# Patient Record
Sex: FEMALE | Race: WHITE | NOT HISPANIC OR LATINO
[De-identification: names, ages, dates, MRNs, and addresses within clinical notes are randomized per-mention and may not be internally consistent; named-entity substitution may affect disease eponyms.]

---

## 2019-07-25 PROBLEM — Z00.00 ENCOUNTER FOR PREVENTIVE HEALTH EXAMINATION: Status: ACTIVE | Noted: 2019-07-25

## 2019-07-26 ENCOUNTER — APPOINTMENT (OUTPATIENT)
Dept: CARDIOLOGY | Facility: CLINIC | Age: 59
End: 2019-07-26
Payer: COMMERCIAL

## 2019-07-26 ENCOUNTER — NON-APPOINTMENT (OUTPATIENT)
Age: 59
End: 2019-07-26

## 2019-07-26 VITALS — SYSTOLIC BLOOD PRESSURE: 160 MMHG | DIASTOLIC BLOOD PRESSURE: 82 MMHG

## 2019-07-26 VITALS
HEIGHT: 64 IN | HEART RATE: 54 BPM | WEIGHT: 146 LBS | SYSTOLIC BLOOD PRESSURE: 180 MMHG | DIASTOLIC BLOOD PRESSURE: 82 MMHG | BODY MASS INDEX: 24.92 KG/M2

## 2019-07-26 DIAGNOSIS — Z82.49 FAMILY HISTORY OF ISCHEMIC HEART DISEASE AND OTHER DISEASES OF THE CIRCULATORY SYSTEM: ICD-10-CM

## 2019-07-26 DIAGNOSIS — Z87.891 PERSONAL HISTORY OF NICOTINE DEPENDENCE: ICD-10-CM

## 2019-07-26 PROCEDURE — 99204 OFFICE O/P NEW MOD 45 MIN: CPT

## 2019-07-26 PROCEDURE — 93000 ELECTROCARDIOGRAM COMPLETE: CPT

## 2019-07-26 RX ORDER — CHROMIUM 200 MCG
TABLET ORAL
Refills: 0 | Status: ACTIVE | COMMUNITY

## 2019-07-26 RX ORDER — MULTIVITAMIN
TABLET ORAL
Refills: 0 | Status: ACTIVE | COMMUNITY

## 2019-07-26 RX ORDER — OMEPRAZOLE 20 MG/1
TABLET, DELAYED RELEASE ORAL
Refills: 0 | Status: ACTIVE | COMMUNITY

## 2019-07-28 ENCOUNTER — APPOINTMENT (OUTPATIENT)
Dept: RADIOLOGY | Facility: HOSPITAL | Age: 59
End: 2019-07-28
Payer: COMMERCIAL

## 2019-07-28 ENCOUNTER — HOSPITAL ENCOUNTER (EMERGENCY)
Facility: HOSPITAL | Age: 59
Discharge: HOME | End: 2019-07-28
Attending: EMERGENCY MEDICINE
Payer: COMMERCIAL

## 2019-07-28 VITALS
HEART RATE: 56 BPM | OXYGEN SATURATION: 97 % | RESPIRATION RATE: 18 BRPM | SYSTOLIC BLOOD PRESSURE: 165 MMHG | DIASTOLIC BLOOD PRESSURE: 51 MMHG | TEMPERATURE: 98.2 F

## 2019-07-28 DIAGNOSIS — S42.211A FX HUMERAL NECK, RIGHT, CLOSED, INITIAL ENCOUNTER: Primary | ICD-10-CM

## 2019-07-28 PROCEDURE — 96374 THER/PROPH/DIAG INJ IV PUSH: CPT

## 2019-07-28 PROCEDURE — 99284 EMERGENCY DEPT VISIT MOD MDM: CPT | Performed by: EMERGENCY MEDICINE

## 2019-07-28 PROCEDURE — 6370000100 HC RX 637 (ALT 250 FOR IP): Performed by: EMERGENCY MEDICINE

## 2019-07-28 PROCEDURE — 73060 X-RAY EXAM OF HUMERUS: CPT | Mod: RT

## 2019-07-28 PROCEDURE — 99284 EMERGENCY DEPT VISIT MOD MDM: CPT

## 2019-07-28 PROCEDURE — 6360000200 HC RX 636 W HCPCS (ALT 250 FOR IP): Performed by: EMERGENCY MEDICINE

## 2019-07-28 RX ORDER — OXYCODONE AND ACETAMINOPHEN 5; 325 MG/1; MG/1
1 TABLET ORAL EVERY 4 HOURS PRN
Qty: 20 TABLET | Refills: 0 | Status: SHIPPED | OUTPATIENT
Start: 2019-07-28 | End: 2019-07-28 | Stop reason: SDUPTHER

## 2019-07-28 RX ORDER — FENTANYL CITRATE/PF 50 MCG/ML
50 PLASTIC BAG, INJECTION (ML) INTRAVENOUS ONCE
Status: COMPLETED | OUTPATIENT
Start: 2019-07-28 | End: 2019-07-28

## 2019-07-28 RX ORDER — OXYCODONE AND ACETAMINOPHEN 5; 325 MG/1; MG/1
1 TABLET ORAL EVERY 4 HOURS PRN
Qty: 25 TABLET | Refills: 0 | Status: SHIPPED | OUTPATIENT
Start: 2019-07-28

## 2019-07-28 RX ORDER — OXYCODONE AND ACETAMINOPHEN 5; 325 MG/1; MG/1
2 TABLET ORAL ONCE
Status: COMPLETED | OUTPATIENT
Start: 2019-07-28 | End: 2019-07-28

## 2019-07-28 RX ADMIN — FENTANYL CITRATE 50 MCG: 0.05 INJECTION, SOLUTION INTRAMUSCULAR; INTRAVENOUS at 08:47

## 2019-07-28 RX ADMIN — OXYCODONE HYDROCHLORIDE AND ACETAMINOPHEN 2 TABLET: 5; 325 TABLET ORAL at 10:12

## 2019-07-28 NOTE — ED PROVIDER NOTES
HPI:  Chief Complaint   Patient presents with   • Arm Pain     right shoulder pain from a fall radiating to entire arm       Patient arrives via EMS amides from her motel after sustaining injury to her right upper arm.  Patient states she slipped in the bathtub reached out to grab the shower curtain and then fell onto her outstretched right arm.  Immediately she began experiencing pain with inability to move her right upper extremity due to pain.  This was associated also with some paresthesias to her hand.  She denies any other injury including denies any head injury, neck pain, elbow or wrist pain.  No current neurologic complaints at this time.          HISTORY:  Past Medical History:   Diagnosis Date   • Hypertension        History reviewed. No pertinent surgical history.    History reviewed. No pertinent family history.    Social History     Tobacco Use   • Smoking status: Former Smoker   • Smokeless tobacco: Former User   Substance Use Topics   • Alcohol use: Defer   • Drug use: Not on file         ROS:  Review of Systems  Pertinent system review documented HPI  PE:  ED Triage Vitals [07/28/19 0757]   Temp Heart Rate Resp BP SpO2   36.8 °C (98.2 °F) 54 18 157/62 97 %      Temp Source Heart Rate Source Patient Position BP Location FiO2 (%)   Oral -- -- -- --       Physical Exam  Patient has tenderness without deformity or ecchymosis to the proximal right humerus.  No clavicular or AC tenderness.  No elbow or wrist tenderness.  Right upper extremity ulnar, median, radial nerve motor or sensory examination of the hand is intact.  Deltoid sensation is intact.  No respiratory distress.  ED LABS:  Labs Reviewed - No data to display      ED IMAGES:  X-ray humerus right   Final Result   Impression:      1. Complex fractures of the right humeral neck.          ED PROCEDURES:  Procedures    ED COURSE:   X-rays to assess for pretest presumptive diagnosis of humeral fracture which is confirmed.  CD disc of x-rays were  "to take home provided.  Pain medication given.         MDM:  MDM patient presents having sustained a fracture of her right humeral neck which is characterized as being \"complex\".  She will placed in an immobilizer and given pain medication and advised to follow-up with orthopedic surgeon upon her arrival home.  She intends to being home in the next week and advised that tomorrow would be a good day to contact what ever follow-up clinic she needs to that this can be arranged before hand so able to see upon returning home.    Final diagnoses:   [S42.211A] Fx humeral neck, right, closed, initial encounter        Ramu Thompson MD  07/28/19 1323    "

## 2019-08-03 ENCOUNTER — EMERGENCY (EMERGENCY)
Facility: HOSPITAL | Age: 59
LOS: 1 days | Discharge: HOME | End: 2019-08-03
Attending: EMERGENCY MEDICINE
Payer: COMMERCIAL

## 2019-08-03 VITALS
DIASTOLIC BLOOD PRESSURE: 68 MMHG | TEMPERATURE: 98 F | RESPIRATION RATE: 20 BRPM | OXYGEN SATURATION: 98 % | SYSTOLIC BLOOD PRESSURE: 153 MMHG | HEART RATE: 66 BPM

## 2019-08-03 VITALS
WEIGHT: 145.95 LBS | DIASTOLIC BLOOD PRESSURE: 68 MMHG | TEMPERATURE: 98 F | HEART RATE: 66 BPM | RESPIRATION RATE: 18 BRPM | OXYGEN SATURATION: 97 % | HEIGHT: 64 IN | SYSTOLIC BLOOD PRESSURE: 153 MMHG

## 2019-08-03 PROCEDURE — 73090 X-RAY EXAM OF FOREARM: CPT | Mod: 26,LT

## 2019-08-03 PROCEDURE — 73200 CT UPPER EXTREMITY W/O DYE: CPT

## 2019-08-03 PROCEDURE — 23600 CLTX PROX HUMRL FX W/O MNPJ: CPT | Mod: RT

## 2019-08-03 PROCEDURE — 73080 X-RAY EXAM OF ELBOW: CPT | Mod: 26,RT

## 2019-08-03 PROCEDURE — 73200 CT UPPER EXTREMITY W/O DYE: CPT | Mod: 26,RT

## 2019-08-03 PROCEDURE — 71250 CT THORAX DX C-: CPT | Mod: 26

## 2019-08-03 PROCEDURE — 73030 X-RAY EXAM OF SHOULDER: CPT | Mod: 26,RT

## 2019-08-03 PROCEDURE — 73030 X-RAY EXAM OF SHOULDER: CPT

## 2019-08-03 PROCEDURE — 99284 EMERGENCY DEPT VISIT MOD MDM: CPT | Mod: 25

## 2019-08-03 PROCEDURE — 73090 X-RAY EXAM OF FOREARM: CPT

## 2019-08-03 PROCEDURE — 71250 CT THORAX DX C-: CPT

## 2019-08-03 PROCEDURE — 99282 EMERGENCY DEPT VISIT SF MDM: CPT | Mod: 57

## 2019-08-03 PROCEDURE — 99284 EMERGENCY DEPT VISIT MOD MDM: CPT

## 2019-08-03 PROCEDURE — 73080 X-RAY EXAM OF ELBOW: CPT

## 2019-08-03 RX ORDER — ACETAMINOPHEN 500 MG
325 TABLET ORAL ONCE
Refills: 0 | Status: COMPLETED | OUTPATIENT
Start: 2019-08-03 | End: 2019-08-03

## 2019-08-03 RX ORDER — OXYCODONE AND ACETAMINOPHEN 5; 325 MG/1; MG/1
1 TABLET ORAL ONCE
Refills: 0 | Status: DISCONTINUED | OUTPATIENT
Start: 2019-08-03 | End: 2019-08-03

## 2019-08-03 RX ADMIN — OXYCODONE AND ACETAMINOPHEN 1 TABLET(S): 5; 325 TABLET ORAL at 18:59

## 2019-08-03 RX ADMIN — Medication 325 MILLIGRAM(S): at 19:00

## 2019-08-03 NOTE — ED ADULT TRIAGE NOTE - CHIEF COMPLAINT QUOTE
pt with pain, numbness, and swelling to right shoulder and arm s/p a fall 7 days ago while on vacation. states she was hx humerus fx

## 2019-08-03 NOTE — ED STATDOCS - ATTENDING CONTRIBUTION TO CARE
I, Melita Schneider, performed the initial face to face bedside interview with this patient regarding history of present illness, review of symptoms and relevant past medical, social and family history.  I completed an independent physical examination.  I was the initial provider who evaluated this patient. I have signed out the follow up of any pending tests (i.e. labs, radiological studies) to the ACP.  I have communicated the patient’s plan of care and disposition with the ACP.  The history, relevant review of systems, past medical and surgical history, medical decision making, and physical examination was documented by the scribe in my presence and I attest to the accuracy of the documentation.

## 2019-08-03 NOTE — CONSULT NOTE ADULT - SUBJECTIVE AND OBJECTIVE BOX
Pt Name: DAVIDA REID    MRN: [id number]      Patient is a 58y Female presenting to the emergency department with a chief complaint of fall 1 week ago.  Patient is a 58y old  Female who presents with a chief complaint of fall 1 week ago.  Patient was on vacation and slipped coming out of shower, landing on right side. patient was seen in hospital and found to have proximal humerus fx. patient c/o right anterior rib pain as well.  patient having CT of chest by ER, CT of shoulder added    HEALTH ISSUES - PROBLEM Dx:right proximal humerus fx       .      REVIEW OF SYSTEMS      General:	    Skin/Breast:  	  Ophthalmologic:  	  ENMT:	    Respiratory and Thorax:  	  Cardiovascular:	    Gastrointestinal:	    Genitourinary:	    Musculoskeletal:	 right shoulder pain     Neurological:	    Psychiatric:	    Hematology/Lymphatics:	    Endocrine:	    Allergic/Immunologic:	    ROS is otherwise negative.    PAST MEDICAL & SURGICAL HISTORY:  PAST MEDICAL & SURGICAL HISTORY:      Allergies: No Known Allergies      Medications:     FAMILY HISTORY:  : non-contributory    Social History: former ETOH abuse    Ambulation: Walking independently               PHYSICAL EXAM:    Vital Signs Last 24 Hrs  T(C): 36.7 (03 Aug 2019 17:18), Max: 36.7 (03 Aug 2019 17:16)  T(F): 98 (03 Aug 2019 17:18), Max: 98 (03 Aug 2019 17:16)  HR: 66 (03 Aug 2019 17:18) (66 - 66)  BP: 153/68 (03 Aug 2019 17:18) (153/68 - 153/68)  BP(mean): --  RR: 20 (03 Aug 2019 17:18) (18 - 20)  SpO2: 98% (03 Aug 2019 17:18) (97% - 98%)  Daily Height in cm: 162.56 (03 Aug 2019 17:16)    Daily     Appearance: Alert, responsive, in no acute distress.    Neurological: Sensation is grossly intact to light touch. 5/5 motor function of all extremities. No focal deficits or weaknesses found.    Skin: no rash on visible skin. Skin is clean, dry and intact. No bleeding. No abrasions. No ulcerations.    Vascular: 2+ distal pulses. Cap refill < 2 sec. No signs of venous insuffiency or stasis. No extremity ulcerations. No cyanosis.    Musculoskeletal:         Right Upper Extremity:positive swelling to upper arm with ecchymosis.  sensation intact to upper arm and fingers.  FROM to wrist and hand.  pulse intact. arm in sling     Imaging Studies: right shoulder xray reveals positive displaced proximal humerus fx, appear to be in GH joint     Case discussed with Dr. Coreas    A/P:  Pt is a 58y Female with presents with a chief complaint of fall 1 week ago found to have proximal humerus fx     PLAN:   *NWB RUE  *Continue sling   * pain control   *outpatient follow up Dr. Coreas

## 2019-08-03 NOTE — ED STATDOCS - PROGRESS NOTE DETAILS
PA Note: PT evaluated by intake physician. HPI/PE/ROS as noted above. Will follow up plan per intake physician. PA Note: Pt seen by ortho PA, pt to follow up with Dr. Coreas.

## 2019-08-03 NOTE — ED STATDOCS - MUSCULOSKELETAL, MLM
Tenderness to palpation of proximal right forearm, laterally and medially, and tenderness to proximal humerus. Clavicle pain.

## 2019-08-03 NOTE — ED STATDOCS - CLINICAL SUMMARY MEDICAL DECISION MAKING FREE TEXT BOX
one week - 10 days s/p fall. broke humerus. has pain in sling and went to opital after it occurred. was in [employer] park and walking around with family. pe as documented. xray + humerus fracture, o/w neg. ct shold same and ct chest wall neg. to fu with ortho. consult done in er

## 2019-08-03 NOTE — ED STATDOCS - OBJECTIVE STATEMENT
59 y/o F pt with PMHx presents to the ED c/o right shoulder pain s/p a mechanical fall in the shower 6 days ago. Pt was diagnosed with a broken humerus after slipping in the shower while on vacation with her family in [place]. Pt landed on the palm of her hand, and just finished the prescribed Oxycodone given by a hospital in [facility], where she was diagnosed. Swelling and bruising has worsened throughout the week, and states that she has right finger numbness, neck pain, bruising along her chest and upper back. Pt is a recovering alcohol abuser. Denies LOC, dizziness, fever, chills, nausea, vomiting. Has been consulting Dr. Freitas. Last medication was 2 Tylenol at 14:00 today.

## 2019-08-03 NOTE — CONSULT NOTE ADULT - ATTENDING COMMENTS
Ortho Trauma Attending:    Agree with above PA note. Fracture will likely be non-operative. Sling for comfort, follow up in the office for repeat x-rays.    Juan Coreas MD  Ortho Trauma Surgery

## 2019-08-14 PROBLEM — Z78.9 OTHER SPECIFIED HEALTH STATUS: Chronic | Status: ACTIVE | Noted: 2019-08-03

## 2019-08-29 PROBLEM — S42.201A FRACTURE OF HUMERUS, PROXIMAL, RIGHT, CLOSED: Status: ACTIVE | Noted: 2019-08-29

## 2019-09-03 ENCOUNTER — APPOINTMENT (OUTPATIENT)
Age: 59
End: 2019-09-03
Payer: COMMERCIAL

## 2019-09-03 VITALS
DIASTOLIC BLOOD PRESSURE: 86 MMHG | SYSTOLIC BLOOD PRESSURE: 153 MMHG | WEIGHT: 145 LBS | HEIGHT: 64 IN | BODY MASS INDEX: 24.75 KG/M2 | TEMPERATURE: 98.1 F | HEART RATE: 70 BPM

## 2019-09-03 DIAGNOSIS — S42.201A UNSPECIFIED FRACTURE OF UPPER END OF RIGHT HUMERUS, INITIAL ENCOUNTER FOR CLOSED FRACTURE: ICD-10-CM

## 2019-09-03 PROCEDURE — 73030 X-RAY EXAM OF SHOULDER: CPT | Mod: RT

## 2019-09-03 PROCEDURE — 99024 POSTOP FOLLOW-UP VISIT: CPT

## 2019-09-03 NOTE — HISTORY OF PRESENT ILLNESS
[de-identified] : the patient is a pleasant 59-year-old female today for evaluation of right shoulder pain. About 5 weeks ago she suffered a fall at [employer]. With a proximal humerus fracture was diagnosed. She subsequently returned home. This is her first office visit with me today.The patient states the pain is made worse with activity and relieved with rest.  Aching, 4/10 will, improving. Wound

## 2019-09-03 NOTE — PHYSICAL EXAM
[de-identified] : Physical Exam:\par General: Well appearing, no acute distress, A&O\par Neurologic: A&Ox3, No focal deficits\par Head: NCAT without abrasions, lacerations, or ecchymosis to head, face, or scalp\par Respiratory: Equal chest wall expansion bilaterally, no accessory muscle use\par Lymphatic: No lymphadenopathy palpated\par Skin: Warm and dry\par Psychiatric: Normal mood and affect\par \par SILT r/m/u\par Fires AIN/PIN/ulnar\par 2+ radial pulse\par brisk capillary refill

## 2019-09-03 NOTE — DISCUSSION/SUMMARY
[de-identified] : 59-year-old female status post right proximal humerus fracture. We discussed that these tend to heal well without operative intervention. We will continue down the standard nonoperative pathway. physical therapy ordered today. She'll return in 5-6 weeks with repeat x-rays and continue the nonoperative pathway. The patient was given the opportunity to ask questions and all questions were answered to their satisfaction.\par \par Juan Coreas MD\par Orthopaedic Trauma Surgeon\par [facility]\par [facility] [place]\par \par \par \par

## 2019-09-03 NOTE — REASON FOR VISIT
[Initial Visit] : an initial visit for [Humerus Fracture] : humerus fracture [Friend] : friend [FreeTextEntry2] : Right humerus fracture

## 2019-10-11 ENCOUNTER — APPOINTMENT (OUTPATIENT)
Dept: ORTHOPEDIC SURGERY | Facility: CLINIC | Age: 59
End: 2019-10-11
Payer: COMMERCIAL

## 2019-10-11 VITALS — TEMPERATURE: 98.1 F | WEIGHT: 145 LBS | HEIGHT: 64 IN | BODY MASS INDEX: 24.75 KG/M2

## 2019-10-11 PROCEDURE — 99024 POSTOP FOLLOW-UP VISIT: CPT

## 2019-10-11 PROCEDURE — 73030 X-RAY EXAM OF SHOULDER: CPT | Mod: TC,RT

## 2019-10-11 NOTE — HISTORY OF PRESENT ILLNESS
[de-identified] : the patient is a pleasant 59-year-old female today for evaluation of right shoulder pain. About 10weeks ago she suffered a fall at [place]. With a proximal humerus fracture was diagnosed. She subsequently returned home. The patient states the pain is made worse with activity and relieved with rest. Aching, 4/10.  She has a MRI shoulder and humerus wo contrast at [facility] a couple of weeks ago which was ordered by another doctor.  She is currently in physical therapy.

## 2019-10-11 NOTE — DISCUSSION/SUMMARY
[de-identified] : Patient will continue P.T. for AAROM/PROM, NWB, modalities.  Will call her after MRI is reviewed by Dr. Coreas.  She will return in 1  month for eval and xrays right shoulder\par Addendum:  MRI report reviewed by Dr. Coreas.  No change in plan.  Continue P.T.  return to office as scheduled\par \par

## 2019-10-11 NOTE — REASON FOR VISIT
[Follow-Up Visit] : a follow-up visit for [Humerus Fracture] : humerus fracture [FreeTextEntry2] : Right humerus fracture follow up.

## 2019-10-11 NOTE — PHYSICAL EXAM
[de-identified] : right shoulder:  FROM elbow, limited ROM shoulder, n/v intact, motor intact [de-identified] : xray right shoulder 2 views:  fracture right proximal humerus

## 2019-10-13 NOTE — ED STATDOCS - CHPI ED RELIEVING FACTORS
FOCUS/GOAL  Discharge planning    ASSESSMENT, INTERVENTIONS AND CONTINUING PLAN FOR GOAL:  Pt removed Zio Patch due to increased skin irritation under adhesive. Patch was placed per pt 7 days ago when she was told to remove it if it bothers her skin. Pt states she has blistered in the past from other adhesives. (Patch will be mailed from pt's home upon discharge tomorrow) Denies pain. Continent of Bowel and bladder.      nothing

## 2019-10-17 ENCOUNTER — MOBILE ON CALL (OUTPATIENT)
Age: 59
End: 2019-10-17

## 2019-11-04 ENCOUNTER — APPOINTMENT (OUTPATIENT)
Dept: ORTHOPEDIC SURGERY | Facility: CLINIC | Age: 59
End: 2019-11-04
Payer: COMMERCIAL

## 2019-11-04 VITALS
SYSTOLIC BLOOD PRESSURE: 167 MMHG | WEIGHT: 146 LBS | HEART RATE: 69 BPM | DIASTOLIC BLOOD PRESSURE: 90 MMHG | HEIGHT: 64 IN | BODY MASS INDEX: 24.92 KG/M2

## 2019-11-04 DIAGNOSIS — S42.201D UNSPECIFIED FRACTURE OF UPPER END OF RIGHT HUMERUS, SUBSEQUENT ENCOUNTER FOR FRACTURE WITH ROUTINE HEALING: ICD-10-CM

## 2019-11-04 PROCEDURE — 73030 X-RAY EXAM OF SHOULDER: CPT | Mod: RT

## 2019-11-04 PROCEDURE — 99213 OFFICE O/P EST LOW 20 MIN: CPT

## 2019-11-04 NOTE — PHYSICAL EXAM
[de-identified] : Physical Exam:\par General: Well appearing, no acute distress, A&O\par Neurologic: A&Ox3, No focal deficits\par Head: NCAT without abrasions, lacerations, or ecchymosis to head, face, or scalp\par Respiratory: Equal chest wall expansion bilaterally, no accessory muscle use\par Lymphatic: No lymphadenopathy palpated\par Skin: Warm and dry\par Psychiatric: Normal mood and affect\par \par right upper extremity\par SILT r/m/u\par Fires AIN/PIN/ulnar\par 2+ radial pulse\par brisk capillary refill\par forward flexion 65, abduction 65 [de-identified] : plain films of the right shoulder were obtained today. They show a healing proximal humerus fracture.

## 2019-11-04 NOTE — DISCUSSION/SUMMARY
[de-identified] : 59-year-old female status post right proximal humerus fracture. We discussed that these tend to heal well without operative intervention. We will continue down the standard nonoperative pathway. physical therapy ordered today. She'll return in 6-8 weeks unless she is feeling she is Healing well & obtain repeat x-rays and continue the nonoperative pathway. The patient was given the opportunity to ask questions and all questions were answered to their satisfaction.\par \par Juan Coreas MD\par Orthopaedic Trauma Surgeon\par [facility]\par [facility] [place]\par \par \par \par

## 2019-11-04 NOTE — HISTORY OF PRESENT ILLNESS
[de-identified] : the patient is a pleasant 59-year-old female today for follow up evaluation of right shoulder pain. About 10 weeks ago she suffered a fall at [employer]. With a proximal humerus fracture was diagnosed. She subsequently returned home. The patient states the pain is made worse with activity and relieved with rest.  Aching, 2/10 , improving. [Bending] : worsened by bending [Lifting] : worsened by lifting [Recumbency] : relieved by recumbency [Rest] : relieved by rest

## 2019-11-04 NOTE — REASON FOR VISIT
[Follow-Up Visit] : a follow-up visit for [Humerus Fracture] : humerus fracture [FreeTextEntry2] : right

## 2019-11-26 ENCOUNTER — MOBILE ON CALL (OUTPATIENT)
Age: 59
End: 2019-11-26

## 2020-01-20 ENCOUNTER — APPOINTMENT (OUTPATIENT)
Dept: CARDIOLOGY | Facility: CLINIC | Age: 60
End: 2020-01-20

## 2020-11-03 ENCOUNTER — APPOINTMENT (OUTPATIENT)
Dept: CARDIOLOGY | Facility: CLINIC | Age: 60
End: 2020-11-03
Payer: COMMERCIAL

## 2020-11-03 ENCOUNTER — NON-APPOINTMENT (OUTPATIENT)
Age: 60
End: 2020-11-03

## 2020-11-03 VITALS
HEART RATE: 71 BPM | HEIGHT: 64 IN | RESPIRATION RATE: 16 BRPM | WEIGHT: 157 LBS | DIASTOLIC BLOOD PRESSURE: 69 MMHG | SYSTOLIC BLOOD PRESSURE: 147 MMHG | BODY MASS INDEX: 26.8 KG/M2 | TEMPERATURE: 97.8 F

## 2020-11-03 VITALS — DIASTOLIC BLOOD PRESSURE: 80 MMHG | SYSTOLIC BLOOD PRESSURE: 132 MMHG

## 2020-11-03 DIAGNOSIS — R09.89 OTHER SPECIFIED SYMPTOMS AND SIGNS INVOLVING THE CIRCULATORY AND RESPIRATORY SYSTEMS: ICD-10-CM

## 2020-11-03 PROCEDURE — 99214 OFFICE O/P EST MOD 30 MIN: CPT

## 2020-11-03 PROCEDURE — 99072 ADDL SUPL MATRL&STAF TM PHE: CPT

## 2020-11-03 PROCEDURE — 93000 ELECTROCARDIOGRAM COMPLETE: CPT

## 2020-11-03 RX ORDER — ASPIRIN 81 MG
81 TABLET, DELAYED RELEASE (ENTERIC COATED) ORAL
Refills: 0 | Status: DISCONTINUED | COMMUNITY
End: 2020-11-03

## 2020-11-03 NOTE — PHYSICAL EXAM
[Normal Conjunctiva] : the conjunctiva exhibited no abnormalities [Eyelids - No Xanthelasma] : the eyelids demonstrated no xanthelasmas [Normal Oral Mucosa] : normal oral mucosa [No Oral Pallor] : no oral pallor [No Oral Cyanosis] : no oral cyanosis [Respiration, Rhythm And Depth] : normal respiratory rhythm and effort [Exaggerated Use Of Accessory Muscles For Inspiration] : no accessory muscle use [Auscultation Breath Sounds / Voice Sounds] : lungs were clear to auscultation bilaterally [FreeTextEntry1] : Regular rhythm, normal S1-S2, grade 1/6 midsystolic murmur [Abdomen Soft] : soft [Abdomen Tenderness] : non-tender [Abdomen Mass (___ Cm)] : no abdominal mass palpated [Abnormal Walk] : normal gait [Gait - Sufficient For Exercise Testing] : the gait was sufficient for exercise testing [Nail Clubbing] : no clubbing of the fingernails [Cyanosis, Localized] : no localized cyanosis [Petechial Hemorrhages (___cm)] : no petechial hemorrhages [Skin Color & Pigmentation] : normal skin color and pigmentation [] : no rash [No Venous Stasis] : no venous stasis [Skin Lesions] : no skin lesions [No Skin Ulcers] : no skin ulcer [No Xanthoma] : no  xanthoma was observed [Oriented To Time, Place, And Person] : oriented to person, place, and time [Affect] : the affect was normal [Mood] : the mood was normal [No Anxiety] : not feeling anxious

## 2020-11-03 NOTE — ASSESSMENT
[FreeTextEntry1] : EKG demonstrates normal sinus rhythm at a rate of 71. There is poor R-wave progression V1 to V3 and nonspecific T-wave flattening-essentially unchanged;\par \par In summary this 60-year-old woman with history of hypertension and hyperlipidemia with abnormal EKG questionable or neck bruit has had otherwise stable cardiac pattern;\par \par Plan:\par \par Discussed recommendation for trying to keep medication schedule more regimented;\par \par Ongoing efforts at low sodium diet and walking exercise plan;\par \par Agree with continuing current medical regimen\par \par Recommend carotid duplex study and transthoracic echocardiogram in the near future or prior to next visit;\par \par Continue timely checkups and laboratory blood tests with primary care encouraged;

## 2020-11-03 NOTE — REASON FOR VISIT
[Follow-Up - Clinic] : a clinic follow-up of [FreeTextEntry1] : The patient is a 60-year-old white female  from Dr. Lopez's office, who returns today to our office for general cardiac checkup;\par \par She does have a history of mild hypertension and prior history for syncope over a year ago which was felt to be likely vasovagal syncope;\par \par Patient reports overall she's been generally feeling well and has had no significant chest pain, shortness of breath, outpatient or dizziness;\par \par [place], she did have a falling episode and injury with humeral fracture, but it did not involve syncope;

## 2020-11-03 NOTE — HISTORY OF PRESENT ILLNESS
[FreeTextEntry1] : She has been tolerating her cholesterol medication and blood pressure medication without difficulty although sometimes she states she varies the time being she takes this she "might sometimes forget";

## 2021-02-08 ENCOUNTER — APPOINTMENT (OUTPATIENT)
Dept: CARDIOLOGY | Facility: CLINIC | Age: 61
End: 2021-02-08
Payer: COMMERCIAL

## 2021-02-08 PROCEDURE — 99072 ADDL SUPL MATRL&STAF TM PHE: CPT

## 2021-02-08 PROCEDURE — 93306 TTE W/DOPPLER COMPLETE: CPT

## 2021-03-16 ENCOUNTER — NON-APPOINTMENT (OUTPATIENT)
Age: 61
End: 2021-03-16

## 2021-03-16 ENCOUNTER — APPOINTMENT (OUTPATIENT)
Dept: CARDIOLOGY | Facility: CLINIC | Age: 61
End: 2021-03-16
Payer: COMMERCIAL

## 2021-03-16 VITALS
SYSTOLIC BLOOD PRESSURE: 138 MMHG | HEIGHT: 64 IN | RESPIRATION RATE: 16 BRPM | HEART RATE: 60 BPM | DIASTOLIC BLOOD PRESSURE: 78 MMHG | TEMPERATURE: 96.4 F | BODY MASS INDEX: 26.8 KG/M2 | WEIGHT: 157 LBS

## 2021-03-16 PROCEDURE — 99072 ADDL SUPL MATRL&STAF TM PHE: CPT

## 2021-03-16 PROCEDURE — 99214 OFFICE O/P EST MOD 30 MIN: CPT

## 2021-03-16 PROCEDURE — 93000 ELECTROCARDIOGRAM COMPLETE: CPT

## 2021-03-16 NOTE — ASSESSMENT
[FreeTextEntry1] : EKG demonstrated a normal sinus rhythm at a rate of 60. There is nonspecific diffuse T-wave changes noted-questionable ischemia;\par \par \par in summary this 60-year-old woman has a history of significant associated cardiac risk factors with abnormal EKG and strong family history for heart disease with a recent chest pain; discomfort syndrome\par \par Plan:\par \par Have discussed possibility of having her undergo a pharmacologic myocardial perfusion stress test  in the near future;\par \par If symptoms persist or worsen between now and stress test could consider doing cardiac catheterization;\par \par ;continue current medical regimen\par \par We'll review her most recent lipid profile and see whether any further antilipemic intervention is warranted;\par \par Return to office within 6-8 weeks or p.r.n.;\par \par Would consider repeating carotid duplex study within 6 months;

## 2021-03-16 NOTE — REASON FOR VISIT
[Follow-Up - Clinic] : a clinic follow-up of [FreeTextEntry1] : A. she is a 60-year-old white female  from Dr. Lopez's office who underwent recent cardiac evaluation with echocardiogram and carotid duplex study;\par \par She has a history for hypertension, abnormal EKG, prior history of syncope and more recent chest pain syndrome.;\par \par There is a very strong family history for early heart disease;\par \par Sadly, she reports her brother at age 65 just recently  from an acute MI;\par Her younger sister recently underwent coronary stenting;\par \par She describes a tightness discomfort in the left parasternal area that comes and goes;\par There has been no significant shortness of breath, palpitations, dizziness or syncope;

## 2021-03-16 NOTE — HISTORY OF PRESENT ILLNESS
[FreeTextEntry1] : She's been tolerating the antihypertensive regimen and overall her blood pressure seems to be better;\par \par She admits to not being able to do much aerobic physical activity due to some arthritides of the lower extremities;\par \par Carotid duplex study performed at [facility] demonstrated at least moderate stenosis in the left internal carotid vessel, and mild diffuse plaquing in the right internal carotid;\par Possible dedicated MRA was recommended;\par \par Transthoracic echocardiogram showed grossly normal cardiac chamber sizes with normal systolic function of the left ventricle and normal ejection fraction in the range of 55-60%;\par No significant valvulopathy was noted;\par \par

## 2021-05-10 ENCOUNTER — APPOINTMENT (OUTPATIENT)
Dept: CARDIOLOGY | Facility: CLINIC | Age: 61
End: 2021-05-10
Payer: COMMERCIAL

## 2021-05-10 PROCEDURE — 78452 HT MUSCLE IMAGE SPECT MULT: CPT

## 2021-05-10 PROCEDURE — 99072 ADDL SUPL MATRL&STAF TM PHE: CPT

## 2021-05-10 PROCEDURE — A9500: CPT

## 2021-05-10 PROCEDURE — 93015 CV STRESS TEST SUPVJ I&R: CPT

## 2021-05-10 RX ADMIN — REGADENOSON 0 MG/5ML: 0.08 INJECTION, SOLUTION INTRAVENOUS at 00:00

## 2021-05-10 RX ADMIN — AMINOPHYLLINE 3 MG/ML: 25 INJECTION, SOLUTION INTRAVENOUS at 00:00

## 2021-05-12 ENCOUNTER — TRANSCRIPTION ENCOUNTER (OUTPATIENT)
Age: 61
End: 2021-05-12

## 2021-05-27 RX ORDER — AMINOPHYLLINE 25 MG/ML
25 INJECTION, SOLUTION INTRAVENOUS
Qty: 0 | Refills: 0 | Status: COMPLETED | OUTPATIENT
Start: 2021-05-10

## 2021-05-27 RX ORDER — REGADENOSON 0.08 MG/ML
0.4 INJECTION, SOLUTION INTRAVENOUS
Qty: 4 | Refills: 0 | Status: COMPLETED | OUTPATIENT
Start: 2021-05-10

## 2021-06-03 RX ORDER — KIT FOR THE PREPARATION OF TECHNETIUM TC99M SESTAMIBI 1 MG/5ML
INJECTION, POWDER, LYOPHILIZED, FOR SOLUTION PARENTERAL
Refills: 0 | Status: COMPLETED | OUTPATIENT
Start: 2021-06-03

## 2021-06-03 RX ADMIN — KIT FOR THE PREPARATION OF TECHNETIUM TC99M SESTAMIBI 0: 1 INJECTION, POWDER, LYOPHILIZED, FOR SOLUTION PARENTERAL at 00:00

## 2021-06-08 ENCOUNTER — NON-APPOINTMENT (OUTPATIENT)
Age: 61
End: 2021-06-08

## 2021-06-08 ENCOUNTER — APPOINTMENT (OUTPATIENT)
Dept: CARDIOLOGY | Facility: CLINIC | Age: 61
End: 2021-06-08
Payer: COMMERCIAL

## 2021-06-08 VITALS
BODY MASS INDEX: 26.8 KG/M2 | HEIGHT: 64 IN | RESPIRATION RATE: 16 BRPM | WEIGHT: 157 LBS | DIASTOLIC BLOOD PRESSURE: 60 MMHG | SYSTOLIC BLOOD PRESSURE: 139 MMHG | TEMPERATURE: 98.5 F | HEART RATE: 60 BPM

## 2021-06-08 PROCEDURE — 93000 ELECTROCARDIOGRAM COMPLETE: CPT

## 2021-06-08 PROCEDURE — 99214 OFFICE O/P EST MOD 30 MIN: CPT

## 2021-06-08 PROCEDURE — 99072 ADDL SUPL MATRL&STAF TM PHE: CPT

## 2021-06-08 NOTE — ASSESSMENT
[FreeTextEntry1] : EKG demonstrated normal sinus rhythm at a rate of 60;  Poor R-wave progression V1 to V3 with nonspecific T-wave changes in the anterior lateral leads-essentially unchanged;\par \par In summary this 60-year-old woman has a history of significant associated cardiac risk factors, abnormal EKG and has had some intermittent twinges of chest discomfort with a borderline abnormal nuclear stress test in the recent past;  Her insurance company [employer], denied the request for cardiac catheterization authorization;\par \par Plan:\par \par Have discussed with patient the possibility of going further in having her undergo a more definitive cardiac catheterization to further delineate her coronary anatomy;\par \par Another option would be to take a more conservative approach since she is minimally to asymptomatic, and it does not appear to be a "high risk scan";\par \par Recommend continue current medical regimen;\par \par Consider repeating nuclear stress test within 6 months to one year down the road for comparison;  Should patient develop any untoward chest symptoms between now and next visit would still consider doing cardiac catheterization at that time if need be;;\par \par ;\par

## 2021-06-08 NOTE — HISTORY OF PRESENT ILLNESS
[FreeTextEntry1] : A nuclear stress test with a pharmacologic protocol performed 5/10/21 and demonstrated-no symptoms of chest pain. EKG remained negative for ST abnormality. Myocardial perfusion imaging suggested a small partially reversible defect in the apical and apical anterior segment compatible with soft tissue attenuation breast artifact, apical thinning and possible ischemia-could not be ruled out;\par \par The patient remains physically active;\par  doing chores and generally feels well with this;\par

## 2021-06-08 NOTE — REASON FOR VISIT
[Symptom and Test Evaluation] : symptom and test evaluation [Hyperlipidemia] : hyperlipidemia [Hypertension] : hypertension [FreeTextEntry3] : A MARIYA [FreeTextEntry1] : A 60-year-old white female  at Dr. Duran's office, who presents back to the office today to discuss results of recent borderline abnormal nuclear stress test;\par \par Patient had been having occasional chest discomfort on and off and has a very strong family history for heart disease including her brother at age 65 who recently  from an acute MI and a younger sister who ordered he has coronary stenting and CAD;\par \par Most recently, patient states that she had been feeling somewhat better and only occasional rare twinges of chest discomfort usually at rest and not particularly exertional related;

## 2021-10-06 ENCOUNTER — APPOINTMENT (OUTPATIENT)
Dept: CARDIOLOGY | Facility: CLINIC | Age: 61
End: 2021-10-06

## 2022-04-12 ENCOUNTER — APPOINTMENT (OUTPATIENT)
Dept: CARDIOLOGY | Facility: CLINIC | Age: 62
End: 2022-04-12
Payer: COMMERCIAL

## 2022-04-12 ENCOUNTER — NON-APPOINTMENT (OUTPATIENT)
Age: 62
End: 2022-04-12

## 2022-04-12 VITALS
HEIGHT: 64 IN | HEART RATE: 59 BPM | BODY MASS INDEX: 26.63 KG/M2 | WEIGHT: 156 LBS | RESPIRATION RATE: 16 BRPM | DIASTOLIC BLOOD PRESSURE: 80 MMHG | SYSTOLIC BLOOD PRESSURE: 130 MMHG

## 2022-04-12 PROCEDURE — 99214 OFFICE O/P EST MOD 30 MIN: CPT

## 2022-04-12 PROCEDURE — 93000 ELECTROCARDIOGRAM COMPLETE: CPT

## 2022-04-12 RX ORDER — ESCITALOPRAM OXALATE 5 MG/1
5 TABLET, FILM COATED ORAL DAILY
Refills: 0 | Status: ACTIVE | COMMUNITY

## 2022-04-12 NOTE — HISTORY OF PRESENT ILLNESS
[FreeTextEntry1] : Her last nuclear stress test was May 2021, and suggested a small apical segment of ischemia and/or artifact; cardiac catheterization was discussed at that time but it was felt that since she was feeling better she wanted to hold off and continue medical management;\par \par More recently, patient has been under a great deal of stress with her daughter who has severe postpartum depression, and has been hospitalized for this.  This seemed to coincide with her increase in chest symptoms recently and why she presents to the office today;\par \par EKG noted to be abnormal today, suggesting anterolateral wall ischemia;

## 2022-04-12 NOTE — REVIEW OF SYSTEMS
[Dyspnea on exertion] : dyspnea during exertion [Chest Discomfort] : chest discomfort [Anxiety] : anxiety [Under Stress] : under stress [Negative] : Heme/Lymph

## 2022-04-12 NOTE — ASSESSMENT
[FreeTextEntry1] : EKG demonstrating normal sinus rhythm at a rate of 59; poor R-wave progression V2 to V4 with diffuse T wave inversions the radha-lateral leads-V3 to V6; suggesting ischemia;\par \par In summary, this 61-year-old woman has a history for associated significant cardiac risk factors including hyperlipidemia and strong family history for early heart disease, Was had a previous abnormal nuclear stress test in our office and now has had recurrent chest pain discomfort syndrome with EKG changes;\par \par Plan:\par \par Patient now recommended for cardiac catheterization;\par \par She is agreeable to schedule this ASAP;\par \par Patient recommended to notify us if symptoms precipitously worsen or change;\par \par Add  enteric-coated aspirin 81 mg p.o. daily;\par \par Follow up to our office postcardiac catheterization or within 2-3 months;

## 2022-04-12 NOTE — REASON FOR VISIT
[CV Risk Factors and Non-Cardiac Disease] : CV risk factors and non-cardiac disease [Arrhythmia/ECG Abnorrmalities] : arrhythmia/ECG abnormalities [Structural Heart and Valve Disease] : structural heart and valve disease [Hyperlipidemia] : hyperlipidemia [FreeTextEntry3] : Jesus Max [FreeTextEntry1] : The patient is a 61-year-old white female who has a history for hyperlipidemia, chest pain syndrome, very strong family history for heart disease for her  siblings and history of abnormal nuclear stress test in the past-who presents back to the office today for cardiac followup;\par \par It seems the patient has been getting some recurring anterior chest discomfort, sometimes at rest and sometimes with exertion;\par \par She gets occasional exertional dyspnea. She denies palpitations, dizziness or syncope;

## 2022-05-25 VITALS
SYSTOLIC BLOOD PRESSURE: 137 MMHG | OXYGEN SATURATION: 99 % | DIASTOLIC BLOOD PRESSURE: 74 MMHG | WEIGHT: 156.09 LBS | HEIGHT: 64 IN | RESPIRATION RATE: 20 BRPM | HEART RATE: 58 BPM

## 2022-05-25 RX ORDER — CHLORHEXIDINE GLUCONATE 213 G/1000ML
1 SOLUTION TOPICAL ONCE
Refills: 0 | Status: DISCONTINUED | OUTPATIENT
Start: 2022-05-26 | End: 2022-06-09

## 2022-05-25 NOTE — H&P PST ADULT - NSICDXPASTMEDICALHX_GEN_ALL_CORE_FT
PAST MEDICAL HISTORY:  Abnormal stress test     HLD (hyperlipidemia)     No pertinent past medical history

## 2022-05-25 NOTE — H&P PST ADULT - HISTORY OF PRESENT ILLNESS
Narrative: This is a 60 yo female with a PMHx of HLD, CP syndrome and a very strong family history for heart disease who had an abnormal NST 5/10/21.  She is c/o recurring anterior chest discomfort at rest and with exertion. Her EKG on 4/12/22 was suggestive of anterolateral wall ischemia.  Today she presents for a [facility] to assess for ischemic HD.     Symptoms:        Angina (Class): CCS 3       Ischemic Symptoms: Chest Pain    Heart Failure: No        Assessment of LVEF:       EF: 55-60%       Assessed by: TTE       Date: 2/15/21    Prior Cardiac Interventions: None    Noninvasive Testing:   Stress Test: Date: 5/10/21       Protocol: Regadenoson Sestamibi Stress Test       Duration of Exercise: NA       Symptoms: Dyspnea, mausea and dizziness during stress which resolved with IV aminophylline       EKG Changes: No significant ST changes       DTS: NA       Myocardial Imaging: Small area of mild to moderate apical/apical anterior wall ischemia.        Risk Assessment: moderate.    Echo: 2/15/21  EF 55-60%  Normal global LV sys function  Normal LV size  LA normal size and structure  Normal RV size and function  RA normal in size and structure  Normal mitral valve   Normal Tricuspid valve  Normal Aortic valve      Antianginal Therapies:        Beta Blockers:         Calcium Channel Blockers: Amlodipine       Long Acting Nitrates:        Ranexa:     Associated Risk Factors:        Cerebrovascular Disease: N/A       Chronic Lung Disease: N/A       Peripheral Arterial Disease: N/A       Chronic Kidney Disease (if yes, what is GFR): N/A       Uncontrolled Diabetes (if yes, what is HgbA1C or FBS): N/A       Poorly Controlled Hypertension (if yes, what is SBP): N/A       Morbid Obesity (if yes, what is BMI): N/A       History of Recent Ventricular Arrhythmia: N/A       Inability to Ambulate Safely: N/A       Need for Therapeutic Anticoagulation: N/A       Antiplatelet or Contrast Allergy: N/A Narrative: This is a 60 yo female with a PMHx of HLD, CP syndrome and a very strong family history for heart disease who had an abnormal NST 5/10/21.  She is c/o recurring anterior chest discomfort at rest and with exertion. Her EKG on 4/12/22 was suggestive of anterolateral wall ischemia.  Today she presents for a [facility] to assess for ischemic HD.     ASA  Mallampati  GFR  Creat  Bleeding Risk  COVID19 -     Symptoms:        Angina (Class): CCS 3       Ischemic Symptoms: Chest Pain    Heart Failure: No        Assessment of LVEF:       EF: 55-60%       Assessed by: TTE       Date: 2/15/21    Prior Cardiac Interventions: None    Noninvasive Testing:   Stress Test: Date: 5/10/21       Protocol: Regadenoson Sestamibi Stress Test       Duration of Exercise: NA       Symptoms: Dyspnea, mausea and dizziness during stress which resolved with IV aminophylline       EKG Changes: No significant ST changes       DTS: NA       Myocardial Imaging: Small area of mild to moderate apical/apical anterior wall ischemia.        Risk Assessment: moderate.    Echo: 2/15/21  EF 55-60%  Normal global LV sys function  Normal LV size  LA normal size and structure  Normal RV size and function  RA normal in size and structure  Normal mitral valve   Normal Tricuspid valve  Normal Aortic valve      Antianginal Therapies:        Beta Blockers:         Calcium Channel Blockers: Amlodipine       Long Acting Nitrates:        Ranexa:     Associated Risk Factors:        Cerebrovascular Disease: N/A       Chronic Lung Disease: N/A       Peripheral Arterial Disease: N/A       Chronic Kidney Disease (if yes, what is GFR): N/A       Uncontrolled Diabetes (if yes, what is HgbA1C or FBS): N/A       Poorly Controlled Hypertension (if yes, what is SBP): N/A       Morbid Obesity (if yes, what is BMI): N/A       History of Recent Ventricular Arrhythmia: N/A       Inability to Ambulate Safely: N/A       Need for Therapeutic Anticoagulation: N/A       Antiplatelet or Contrast Allergy: N/A Narrative: This is a 62 yo female with a PMHx of HLD, CP syndrome and a very strong family history for heart disease who had an abnormal NST 5/10/21.  She is c/o recurring anterior chest discomfort at rest and with exertion. Her EKG on 4/12/22 was suggestive of anterolateral wall ischemia.  Today she presents for a [facility] to assess for ischemic HD.     ASA-2  Mallampati-2  GFR  Creat  Bleeding Risk  COVID19 - Negative 5/25/22    Symptoms:        Angina (Class): CCS 4       Ischemic Symptoms: Chest Pain @ rest intermittently    Heart Failure: No        Assessment of LVEF:       EF: 55-60%       Assessed by: TTE       Date: 2/15/21    Prior Cardiac Interventions: None    Noninvasive Testing:   Stress Test: Date: 5/10/21       Protocol: Regadenoson Sestamibi Stress Test       Duration of Exercise: NA       Symptoms: Dyspnea, mausea and dizziness during stress which resolved with IV aminophylline       EKG Changes: No significant ST changes       DTS: NA       Myocardial Imaging: Small area of mild to moderate apical/apical anterior wall ischemia.        Risk Assessment: moderate.    Echo: 2/15/21  EF 55-60%  Normal global LV sys function  Normal LV size  LA normal size and structure  Normal RV size and function  RA normal in size and structure  Normal mitral valve   Normal Tricuspid valve  Normal Aortic valve      Antianginal Therapies:        Beta Blockers:         Calcium Channel Blockers: Amlodipine       Long Acting Nitrates:        Ranexa:     Associated Risk Factors:        Cerebrovascular Disease: N/A       Chronic Lung Disease: N/A       Peripheral Arterial Disease: N/A       Chronic Kidney Disease (if yes, what is GFR): N/A       Uncontrolled Diabetes (if yes, what is HgbA1C or FBS): N/A       Poorly Controlled Hypertension (if yes, what is SBP): N/A       Morbid Obesity (if yes, what is BMI): N/A       History of Recent Ventricular Arrhythmia: N/A       Inability to Ambulate Safely: N/A       Need for Therapeutic Anticoagulation: N/A       Antiplatelet or Contrast Allergy: N/A Narrative: This is a 62 yo female with a PMHx of HLD, CP syndrome and a very strong family history for heart disease who had an abnormal NST 5/10/21.  She is c/o recurring anterior chest discomfort at rest and with exertion. Her EKG on 4/12/22 was suggestive of anterolateral wall ischemia.  Today she presents for a [facility] to assess for ischemic HD.     ASA-2  Mallampati-2  GFR-99  Creat-0.70  Bleeding Risk-1.6%  COVID19 - Negative 5/25/22    Symptoms:        Angina (Class): CCS 4       Ischemic Symptoms: Chest Pain @ rest intermittently    Heart Failure: No        Assessment of LVEF:       EF: 55-60%       Assessed by: TTE       Date: 2/15/21    Prior Cardiac Interventions: None    Noninvasive Testing:   Stress Test: Date: 5/10/21       Protocol: Regadenoson Sestamibi Stress Test       Duration of Exercise: NA       Symptoms: Dyspnea, mausea and dizziness during stress which resolved with IV aminophylline       EKG Changes: No significant ST changes       DTS: NA       Myocardial Imaging: Small area of mild to moderate apical/apical anterior wall ischemia.        Risk Assessment: moderate.    Echo: 2/15/21  EF 55-60%  Normal global LV sys function  Normal LV size  LA normal size and structure  Normal RV size and function  RA normal in size and structure  Normal mitral valve   Normal Tricuspid valve  Normal Aortic valve      Antianginal Therapies:        Beta Blockers:         Calcium Channel Blockers: Amlodipine       Long Acting Nitrates:        Ranexa:     Associated Risk Factors:        Cerebrovascular Disease: N/A       Chronic Lung Disease: N/A       Peripheral Arterial Disease: N/A       Chronic Kidney Disease (if yes, what is GFR): N/A       Uncontrolled Diabetes (if yes, what is HgbA1C or FBS): N/A       Poorly Controlled Hypertension (if yes, what is SBP): N/A       Morbid Obesity (if yes, what is BMI): N/A       History of Recent Ventricular Arrhythmia: N/A       Inability to Ambulate Safely: N/A       Need for Therapeutic Anticoagulation: N/A       Antiplatelet or Contrast Allergy: N/A

## 2022-05-25 NOTE — H&P PST ADULT - ASSESSMENT
60 yo female with c/o CP and an abnormal stress test for LHC.     Plan: PRE-PROCEDURE ASSESSMENT    -Patient seen and examined  PRE-PROCEDURE ASSESSMENT  -NPO after midnight confirmed  -IV insert  -Patient seen and examined  -Labs reviewed  -Pre-procedure teaching completed with patient   -consent obtained   -Questions answered   60 yo female with c/o CP and an abnormal stress test for LHC.     Plan: PRE-PROCEDURE ASSESSMENT    -Patient seen and examined  PRE-PROCEDURE ASSESSMENT  -NPO after midnight confirmed  -Baby ASA given this am  -IV insert  -IV hydration given with 250cc Normal Saline  -Patient seen and examined  -Labs reviewed  -Pre-procedure teaching completed with patient   -consent obtained   -Questions answered

## 2022-05-26 ENCOUNTER — TRANSCRIPTION ENCOUNTER (OUTPATIENT)
Age: 62
End: 2022-05-26

## 2022-05-26 ENCOUNTER — OUTPATIENT (OUTPATIENT)
Dept: OUTPATIENT SERVICES | Facility: HOSPITAL | Age: 62
LOS: 1 days | End: 2022-05-26
Payer: COMMERCIAL

## 2022-05-26 VITALS
OXYGEN SATURATION: 98 % | HEART RATE: 58 BPM | RESPIRATION RATE: 16 BRPM | SYSTOLIC BLOOD PRESSURE: 166 MMHG | DIASTOLIC BLOOD PRESSURE: 86 MMHG

## 2022-05-26 DIAGNOSIS — R94.31 ABNORMAL ELECTROCARDIOGRAM [ECG] [EKG]: ICD-10-CM

## 2022-05-26 DIAGNOSIS — R07.9 CHEST PAIN, UNSPECIFIED: ICD-10-CM

## 2022-05-26 DIAGNOSIS — R94.39 ABNORMAL RESULT OF OTHER CARDIOVASCULAR FUNCTION STUDY: ICD-10-CM

## 2022-05-26 LAB
ALBUMIN SERPL ELPH-MCNC: 4.1 G/DL — SIGNIFICANT CHANGE UP (ref 3.3–5.2)
ALP SERPL-CCNC: 72 U/L — SIGNIFICANT CHANGE UP (ref 40–120)
ALT FLD-CCNC: 21 U/L — SIGNIFICANT CHANGE UP
ANION GAP SERPL CALC-SCNC: 10 MMOL/L — SIGNIFICANT CHANGE UP (ref 5–17)
AST SERPL-CCNC: 18 U/L — SIGNIFICANT CHANGE UP
BASOPHILS # BLD AUTO: 0.07 K/UL — SIGNIFICANT CHANGE UP (ref 0–0.2)
BASOPHILS NFR BLD AUTO: 1 % — SIGNIFICANT CHANGE UP (ref 0–2)
BILIRUB DIRECT SERPL-MCNC: 0.1 MG/DL — SIGNIFICANT CHANGE UP (ref 0–0.3)
BILIRUB INDIRECT FLD-MCNC: 0.3 MG/DL — SIGNIFICANT CHANGE UP (ref 0.2–1)
BILIRUB SERPL-MCNC: 0.4 MG/DL — SIGNIFICANT CHANGE UP (ref 0.4–2)
BUN SERPL-MCNC: 15.2 MG/DL — SIGNIFICANT CHANGE UP (ref 8–20)
CALCIUM SERPL-MCNC: 8.9 MG/DL — SIGNIFICANT CHANGE UP (ref 8.6–10.2)
CHLORIDE SERPL-SCNC: 102 MMOL/L — SIGNIFICANT CHANGE UP (ref 98–107)
CHOLEST SERPL-MCNC: 188 MG/DL — SIGNIFICANT CHANGE UP
CO2 SERPL-SCNC: 27 MMOL/L — SIGNIFICANT CHANGE UP (ref 22–29)
CREAT SERPL-MCNC: 0.7 MG/DL — SIGNIFICANT CHANGE UP (ref 0.5–1.3)
EGFR: 99 ML/MIN/1.73M2 — SIGNIFICANT CHANGE UP
EOSINOPHIL # BLD AUTO: 0.23 K/UL — SIGNIFICANT CHANGE UP (ref 0–0.5)
EOSINOPHIL NFR BLD AUTO: 3.2 % — SIGNIFICANT CHANGE UP (ref 0–6)
GLUCOSE SERPL-MCNC: 99 MG/DL — SIGNIFICANT CHANGE UP (ref 70–99)
HCT VFR BLD CALC: 42.5 % — SIGNIFICANT CHANGE UP (ref 34.5–45)
HDLC SERPL-MCNC: 64 MG/DL — SIGNIFICANT CHANGE UP
HGB BLD-MCNC: 14 G/DL — SIGNIFICANT CHANGE UP (ref 11.5–15.5)
IMM GRANULOCYTES NFR BLD AUTO: 0.1 % — SIGNIFICANT CHANGE UP (ref 0–1.5)
LIPID PNL WITH DIRECT LDL SERPL: 105 MG/DL — HIGH
LYMPHOCYTES # BLD AUTO: 2.87 K/UL — SIGNIFICANT CHANGE UP (ref 1–3.3)
LYMPHOCYTES # BLD AUTO: 39.5 % — SIGNIFICANT CHANGE UP (ref 13–44)
MCHC RBC-ENTMCNC: 31 PG — SIGNIFICANT CHANGE UP (ref 27–34)
MCHC RBC-ENTMCNC: 32.9 GM/DL — SIGNIFICANT CHANGE UP (ref 32–36)
MCV RBC AUTO: 94.2 FL — SIGNIFICANT CHANGE UP (ref 80–100)
MONOCYTES # BLD AUTO: 0.48 K/UL — SIGNIFICANT CHANGE UP (ref 0–0.9)
MONOCYTES NFR BLD AUTO: 6.6 % — SIGNIFICANT CHANGE UP (ref 2–14)
NEUTROPHILS # BLD AUTO: 3.6 K/UL — SIGNIFICANT CHANGE UP (ref 1.8–7.4)
NEUTROPHILS NFR BLD AUTO: 49.6 % — SIGNIFICANT CHANGE UP (ref 43–77)
NON HDL CHOLESTEROL: 124 MG/DL — SIGNIFICANT CHANGE UP
PLATELET # BLD AUTO: 218 K/UL — SIGNIFICANT CHANGE UP (ref 150–400)
POTASSIUM SERPL-MCNC: 4.3 MMOL/L — SIGNIFICANT CHANGE UP (ref 3.5–5.3)
POTASSIUM SERPL-SCNC: 4.3 MMOL/L — SIGNIFICANT CHANGE UP (ref 3.5–5.3)
PROT SERPL-MCNC: 6.4 G/DL — LOW (ref 6.6–8.7)
RBC # BLD: 4.51 M/UL — SIGNIFICANT CHANGE UP (ref 3.8–5.2)
RBC # FLD: 11.6 % — SIGNIFICANT CHANGE UP (ref 10.3–14.5)
SODIUM SERPL-SCNC: 139 MMOL/L — SIGNIFICANT CHANGE UP (ref 135–145)
TRIGL SERPL-MCNC: 93 MG/DL — SIGNIFICANT CHANGE UP
TROPONIN T SERPL-MCNC: <0.01 NG/ML — SIGNIFICANT CHANGE UP (ref 0–0.06)
WBC # BLD: 7.26 K/UL — SIGNIFICANT CHANGE UP (ref 3.8–10.5)
WBC # FLD AUTO: 7.26 K/UL — SIGNIFICANT CHANGE UP (ref 3.8–10.5)

## 2022-05-26 PROCEDURE — 93005 ELECTROCARDIOGRAM TRACING: CPT

## 2022-05-26 PROCEDURE — 93010 ELECTROCARDIOGRAM REPORT: CPT

## 2022-05-26 PROCEDURE — 80061 LIPID PANEL: CPT

## 2022-05-26 PROCEDURE — 85025 COMPLETE CBC W/AUTO DIFF WBC: CPT

## 2022-05-26 PROCEDURE — C1894: CPT

## 2022-05-26 PROCEDURE — 93458 L HRT ARTERY/VENTRICLE ANGIO: CPT | Mod: 26

## 2022-05-26 PROCEDURE — 84484 ASSAY OF TROPONIN QUANT: CPT

## 2022-05-26 PROCEDURE — 80076 HEPATIC FUNCTION PANEL: CPT

## 2022-05-26 PROCEDURE — C1769: CPT

## 2022-05-26 PROCEDURE — 36415 COLL VENOUS BLD VENIPUNCTURE: CPT

## 2022-05-26 PROCEDURE — 93458 L HRT ARTERY/VENTRICLE ANGIO: CPT

## 2022-05-26 PROCEDURE — 80048 BASIC METABOLIC PNL TOTAL CA: CPT

## 2022-05-26 PROCEDURE — C1887: CPT

## 2022-05-26 RX ORDER — ASPIRIN/CALCIUM CARB/MAGNESIUM 324 MG
81 TABLET ORAL ONCE
Refills: 0 | Status: COMPLETED | OUTPATIENT
Start: 2022-05-26 | End: 2022-05-26

## 2022-05-26 RX ORDER — SIMVASTATIN 20 MG/1
1 TABLET, FILM COATED ORAL
Qty: 0 | Refills: 0 | DISCHARGE

## 2022-05-26 RX ORDER — SODIUM CHLORIDE 9 MG/ML
250 INJECTION INTRAMUSCULAR; INTRAVENOUS; SUBCUTANEOUS ONCE
Refills: 0 | Status: COMPLETED | OUTPATIENT
Start: 2022-05-26 | End: 2022-05-26

## 2022-05-26 RX ORDER — AMLODIPINE BESYLATE 2.5 MG/1
1 TABLET ORAL
Qty: 0 | Refills: 0 | DISCHARGE

## 2022-05-26 RX ORDER — OMEPRAZOLE 10 MG/1
1 CAPSULE, DELAYED RELEASE ORAL
Qty: 0 | Refills: 0 | DISCHARGE

## 2022-05-26 RX ORDER — ESCITALOPRAM OXALATE 10 MG/1
1 TABLET, FILM COATED ORAL
Qty: 0 | Refills: 0 | DISCHARGE

## 2022-05-26 RX ADMIN — Medication 81 MILLIGRAM(S): at 09:45

## 2022-05-26 RX ADMIN — SODIUM CHLORIDE 250 MILLILITER(S): 9 INJECTION INTRAMUSCULAR; INTRAVENOUS; SUBCUTANEOUS at 09:45

## 2022-05-26 NOTE — DISCHARGE NOTE NURSING/CASE MANAGEMENT/SOCIAL WORK - PATIENT PORTAL LINK FT
You can access the FollowMyHealth Patient Portal offered by [facility] by registering at the following website: [email or web address]. By joining [employer]’s FollowMyHealth portal, you will also be able to view your health information using other applications (apps) compatible with our system.

## 2022-05-26 NOTE — DISCHARGE NOTE NURSING/CASE MANAGEMENT/SOCIAL WORK - NSDCPEFALRISK_GEN_ALL_CORE
For information on Fall & Injury Prevention, visit: [email or web address] OR  [email or web address] OR  https://www.cdc.gov/steadi/patient.html

## 2022-05-26 NOTE — DISCHARGE NOTE PROVIDER - NSDCCPTREATMENT_GEN_ALL_CORE_FT
PRINCIPAL PROCEDURE  Procedure: Left heart catheterization  Findings and Treatment: normal coronaries no intervention

## 2022-05-26 NOTE — DISCHARGE NOTE PROVIDER - NSDCMRMEDTOKEN_GEN_ALL_CORE_FT
amLODIPine 5 mg oral tablet: 1 tab(s) orally once a day  Lexapro 10 mg oral tablet: 1 tab(s) orally once a day  omeprazole 40 mg oral delayed release capsule: 1 cap(s) orally once a day  simvastatin 20 mg oral tablet: 1 tab(s) orally once a day (at bedtime)

## 2022-05-26 NOTE — DISCHARGE NOTE PROVIDER - NSDCCPCAREPLAN_GEN_ALL_CORE_FT
PRINCIPAL DISCHARGE DIAGNOSIS  Diagnosis: CAD (coronary artery disease)  Assessment and Plan of Treatment: Non obstructive no intervention via RRA

## 2022-05-26 NOTE — PROGRESS NOTE ADULT - SUBJECTIVE AND OBJECTIVE BOX
Department of Cardiology                                                                  [facility]/[place] [address] [facility]                                                            Telephone: [phone]. Fax:[phone]                                                    Post- Procedure Note: Left Heart Cardiac Catheterization       Narrative:  61y  Female is now s/p left heart catheterization via #6Fr RRA by Dr Ryan Cardona-no intervention/ normal coronaries/ EDP-22  Contrast 30cc given in lab, 3,000 units Heparin, approximately 400cc total IVF          PAST MEDICAL & SURGICAL HISTORY:  No pertinent past medical history      HLD (hyperlipidemia)      Abnormal stress test      No significant past surgical history        Home Medications:  amLODIPine 5 mg oral tablet: 1 tab(s) orally once a day (26 May 2022 09:13)  Lexapro 10 mg oral tablet: 1 tab(s) orally once a day (26 May 2022 09:13)  omeprazole 40 mg oral delayed release capsule: 1 cap(s) orally once a day (26 May 2022 09:13)  simvastatin 20 mg oral tablet: 1 tab(s) orally once a day (at bedtime) (26 May 2022 09:13)        No Known Allergies      Objective:  Vital Signs Last 24 Hrs  T(C): 36.5 (26 May 2022 09:15), Max: 36.5 (26 May 2022 09:15)  T(F): 97.7 (26 May 2022 09:15), Max: 97.7 (26 May 2022 09:15)  HR: 55 (26 May 2022 11:15) (51 - 58)  BP: 160/86 (26 May 2022 11:15) (134/64 - 160/86)  BP(mean): 95 (25 May 2022 16:15) (95 - 95)  RR: 18 (26 May 2022 11:15) (16 - 20)  SpO2: 96% (26 May 2022 11:15) (94% - 99%)    GENERAL: Pt lying comfortably, NAD.  ENMT: PERRL, +EOMI.  NECK: soft, Supple, No JVD,   CHEST/LUNG: Clear to auscultatation bilaterally; No wheezing.  HEART: S1S2+, Regular rate and rhythm; No murmurs.  ABDOMEN: Soft, Nontender, Nondistended; Bowel sounds present.  MUSCULOSKELETAL: Normal range of motion.  SKIN: No rashes or lesions.  NEURO: AAOX3, no focal deficits, no motor r sensory loss.  PSYCH: normal mood.  Procedure site: #6Fr.RRA........, no signs of bleeding or hematoma, neurovascular intact   VASCULAR:   Radial +2 R/+2 L  Femoral +2 R/+2 L  PT +2 R/+2 L  DP +2 R/+2 L                          14.0   7.26  )-----------( 218      ( 26 May 2022 09:10 )             42.5     05-26    139  |  102  |  15.2  ----------------------------<  99  4.3   |  27.0  |  0.70    Ca    8.9      26 May 2022 09:10    TPro  6.4<L>  /  Alb  4.1  /  TBili  0.4  /  DBili  0.1  /  AST  18  /  ALT  21  /  AlkPhos  72  05-26        Patient is a 61y old  Female who presents with a chief complaint of Patient for LHC secondary to abnormal NST. (25 May 2022 16:15)    61y  Female is now s/p left heart catheterization via #6Fr RRA by Dr Ryan Cardona-no intervention/ normal coronaries/ EDP-22  -post cardiac cath orders  -RRA precautions  -bedrest x 1 hour post procedure  -continue current medical therapy  -Continue statin therapy  -Discussed optimizing BP management  -follow up outpt in 2 weeks with Cardiologist-Dr Jamal Reardon  -Lifestyle modifications discussed to reduce cardiovascular risk factors including weight reduction, smoking cessation, medication compliance, and routine follow up with Cardiologist to track your BMI, cholesterol, and glucose levels.   -Discharge home 1 hour post RRA hemoband removal

## 2022-05-26 NOTE — DISCHARGE NOTE PROVIDER - NSDCFUSCHEDAPPT_GEN_ALL_CORE_FT
Jamal Reardon Physician [facility]  Cardiology [address]  Scheduled Appointment: 08/15/2022

## 2022-05-26 NOTE — ASU PATIENT PROFILE, ADULT - FALL HARM RISK - UNIVERSAL INTERVENTIONS
Bed in lowest position, wheels locked, appropriate side rails in place/Call bell, personal items and telephone in reach/Instruct patient to call for assistance before getting out of bed or chair/Non-slip footwear when patient is out of bed/[place] to call system/Physically safe environment - no spills, clutter or unnecessary equipment/Purposeful Proactive Rounding/Room/bathroom lighting operational, light cord in reach

## 2022-05-26 NOTE — DISCHARGE NOTE PROVIDER - HOSPITAL COURSE
Patient is a 61y old  Female who presents with a chief complaint of Patient for LHC secondary to abnormal NST. (25 May 2022 16:15)    61y  Female is now s/p left heart catheterization via #6Fr RRA by Dr Ryan Cardona-no intervention/ normal coronaries/ EDP-22  -post cardiac cath orders  -RRA precautions  -bedrest x 1 hour post procedure  -continue current medical therapy  -Continue statin therapy  -Discussed optimizing BP management  -follow up outpt in 2 weeks with Cardiologist-Dr Jamal Reardon  -Lifestyle modifications discussed to reduce cardiovascular risk factors including weight reduction, smoking cessation, medication compliance, and routine follow up with Cardiologist to track your BMI, cholesterol, and glucose levels.   -Discharge home 1 hour post RRA hemoband removal

## 2022-05-26 NOTE — ASU DISCHARGE PLAN (ADULT/PEDIATRIC) - NS MD DC FALL RISK RISK
For information on Fall & Injury Prevention, visit: [email or web address] OR  [email or web address] OR  https://www.cdc.gov/steadi/patient.html

## 2022-05-26 NOTE — DISCHARGE NOTE PROVIDER - CARE PROVIDER_API CALL
Jamal Reardon)  Internal Medicine  [address] [place] [address]  Phone: [phone]  Fax: [phone]  Follow Up Time:

## 2022-08-15 ENCOUNTER — APPOINTMENT (OUTPATIENT)
Dept: CARDIOLOGY | Facility: CLINIC | Age: 62
End: 2022-08-15

## 2022-08-15 VITALS
HEART RATE: 60 BPM | SYSTOLIC BLOOD PRESSURE: 140 MMHG | BODY MASS INDEX: 27.31 KG/M2 | RESPIRATION RATE: 16 BRPM | WEIGHT: 160 LBS | HEIGHT: 64 IN | DIASTOLIC BLOOD PRESSURE: 70 MMHG

## 2022-08-15 DIAGNOSIS — R94.39 ABNORMAL RESULT OF OTHER CARDIOVASCULAR FUNCTION STUDY: ICD-10-CM

## 2022-08-15 PROBLEM — E78.5 HYPERLIPIDEMIA, UNSPECIFIED: Chronic | Status: ACTIVE | Noted: 2022-05-25

## 2022-08-15 PROCEDURE — 99214 OFFICE O/P EST MOD 30 MIN: CPT

## 2022-08-15 NOTE — REASON FOR VISIT
[CV Risk Factors and Non-Cardiac Disease] : CV risk factors and non-cardiac disease [Arrhythmia/ECG Abnorrmalities] : arrhythmia/ECG abnormalities [Structural Heart and Valve Disease] : structural heart and valve disease [Hyperlipidemia] : hyperlipidemia [FreeTextEntry3] : Jesus Max [FreeTextEntry1] : The patient is a 61-year-old white female who has a history for hyperlipidemia, chest pain syndrome, very strong family history for heart disease for her  siblings and history of abnormal nuclear stress test in the past-who presents back to the office today for cardiac followup;\par \par Patient returns to the office to discuss results of recent cardiac catheterization, and reports she has been generally feeling well without significant chest pain, shortness of breath, palpitations or dizziness;\par

## 2022-08-15 NOTE — ASSESSMENT
[FreeTextEntry1] : EKG demonstrating normal sinus rhythm at a rate of 60 ; poor R-wave progression V2 to V4 with diffuse T wave inversions the radha-lateral leads-V3 to V6--essentially unchanged;\par \par In summary, this 61-year-old woman has a history for associated significant cardiac risk factors including hyperlipidemia and strong family history for early heart disease, abnormal EKG, and abnormal nuclear stress test for which resulted in finding normal coronary arteries at recent cardiac catheterization;\par Fortunately, patient appears to be clinically stable with the exception of some increased stress she has been \par \par \par Plan:\par \par Patient reassured;\par \par Will continue to monitor blood pressure on a periodic basis but as long as it "averages" in the normal range we will just continue with current medical regimen;\par \par Moderate physical activity and walking exercise regimen recommended\par \par Return to office within 5 to 6 months or as needed;\par \par No additional cardiac work-up indicated at this time;;

## 2022-08-15 NOTE — HISTORY OF PRESENT ILLNESS
[FreeTextEntry1] : Cardiac catheterization was performed at [facility] on May 26, 2022.  The study demonstrated normal coronary arteries with some tortuosity of the vessels but no evidence of obstructive disease–i.e. negative test;\par \par Patient feels she is still  under a great deal of stress,  with her daughter who has severe postpartum depression;.  This seemed to coincide with her increase in chest symptoms recently-leading up to cardiac testing;\par

## 2023-02-06 ENCOUNTER — NON-APPOINTMENT (OUTPATIENT)
Age: 63
End: 2023-02-06

## 2023-02-06 ENCOUNTER — APPOINTMENT (OUTPATIENT)
Dept: CARDIOLOGY | Facility: CLINIC | Age: 63
End: 2023-02-06
Payer: COMMERCIAL

## 2023-02-06 VITALS
HEIGHT: 64 IN | BODY MASS INDEX: 26.12 KG/M2 | WEIGHT: 153 LBS | DIASTOLIC BLOOD PRESSURE: 80 MMHG | SYSTOLIC BLOOD PRESSURE: 146 MMHG | RESPIRATION RATE: 16 BRPM | HEART RATE: 52 BPM

## 2023-02-06 PROCEDURE — 93000 ELECTROCARDIOGRAM COMPLETE: CPT

## 2023-02-06 PROCEDURE — 99214 OFFICE O/P EST MOD 30 MIN: CPT | Mod: 25

## 2023-02-06 NOTE — REASON FOR VISIT
[FreeTextEntry1] : DAVIDA REID is being seen for CV risk factors and non-cardiac disease, arrhythmia/ECG abnormalities, structural heart and valve disease and hyperlipidemia. \par \par The patient is a 62-year-old white female who has a history for hyperlipidemia, chest pain syndrome, very strong family history for heart disease for her siblings and history of abnormal nuclear stress test in the past-who presents back to the office today for cardiac followup;\par \par Patient returns to the office for general cardiac checkup;\par \par She reports she has been generally feeling well without significant chest pain, shortness of breath, palpitations or dizziness;

## 2023-02-06 NOTE — PHYSICAL EXAM
[Well Developed] : well developed [Well Nourished] : well nourished [No Acute Distress] : no acute distress [Normal Conjunctiva] : normal conjunctiva [Normal Venous Pressure] : normal venous pressure [Carotid Bruit] : carotid bruit [Normal S1, S2] : normal S1, S2 [No Murmur] : no murmur [No Rub] : no rub [No Gallop] : no gallop [Clear Lung Fields] : clear lung fields [Good Air Entry] : good air entry [No Respiratory Distress] : no respiratory distress  [Soft] : abdomen soft [Non Tender] : non-tender [No Masses/organomegaly] : no masses/organomegaly [Normal Gait] : normal gait [No Edema] : no edema [No Cyanosis] : no cyanosis [No Clubbing] : no clubbing [No Rash] : no rash [No Skin Lesions] : no skin lesions [Moves all extremities] : moves all extremities [No Focal Deficits] : no focal deficits [Normal Speech] : normal speech [Alert and Oriented] : alert and oriented [Normal memory] : normal memory [de-identified] : left

## 2023-02-06 NOTE — HISTORY OF PRESENT ILLNESS
[FreeTextEntry1] : Unfortunately, she continues to be under a great deal of stress at-home with her daughter who has severe postpartum depression and she has also re-started smoking cigarettes periodically; \par \par She reports her at-home blood pressures have been averaging in the high 130s to 140s over 70s to 80s despite taking her Amlodipine 5 mg nightly.  Today's BP is elevated at (146/80);\par \par Most recent cardiac catheterization was performed at [facility] on May 26, 2022. The study demonstrated normal coronary arteries with some tortuosity of the vessels but no evidence of obstructive disease–i.e. negative test;\par \par Last Transthoracic Echocardiogram (February 2021) normal global LV systolic function with an LVEF of 55 to 60%.  The left and right atria normal in size and structure.  There was no significant valvulopathy noted.\par \par Last Carotid duplex study (November 2020) performed at [facility] demonstrated at least moderate stenosis in the left internal carotid vessel, and mild diffuse plaquing in the right internal carotid;\par Possible dedicated MRA was recommended;

## 2023-02-06 NOTE — ASSESSMENT
[FreeTextEntry1] : EKG demonstrating normal sinus rhythm at a rate of 52 ; poor R-wave progression V1 to V3 with diffuse T wave inversions the radha-lateral leads-V1 to V6--essentially unchanged;\par \par In summary, this 62-year-old woman has a history for associated significant cardiac risk factors including hyperlipidemia and strong family history for early heart disease, abnormal EKG, and abnormal nuclear stress test for which resulted in finding normal coronary arteries at recent cardiac catheterization;\par Fortunately, patient appears to be clinically stable with the exception of some increased stress she has been experiencing at home, re-started smoking cigarettes as well;\par \par Blood pressures have been averaging in the high 130s to 140s systolic lately despite taking Amlodipine 5 mg NIGHTLY; \par \par Had prior Carotid Doppler a few years ago which demonstrate at least moderate carotid plaquing stenosis in the range of 40-59%.  Her LDL levels have been around 100 lately;\par \par \par Plan:\par \par Advised she begin taking Amlodipine 5 mg in the A.M. instead of at night time.  Will empirically add HCTZ 12.5 mg daily in hopes of more optimal blood pressure control;\par \par Patient instructed to continue to monitor BP at home 2-3 x week and bring log to f/u.  Contact our office within a couple weeks if still averaging systolics >140.  Will consider titrating up either diuretic and/or CCB;\par \par Counseled patient on smoking cessation;\par \par Recommend she complete a Transthoracic Echocardiogram and Carotid Doppler study prior to follow up to assess overall cardiac function and valvulopathy as well as to assess extent of carotid plaquing stenosis respectively; \par \par Continue all cardiac medications including Simvastatin 20 mg QHS with LDL goal of ~70;\par \par Moderate physical activity and walking exercise regimen recommended;\par \par Return to office with Dr. Reardon within 4 to 5 months or as needed;.

## 2023-06-06 ENCOUNTER — APPOINTMENT (OUTPATIENT)
Dept: CARDIOLOGY | Facility: CLINIC | Age: 63
End: 2023-06-06

## 2023-06-30 ENCOUNTER — APPOINTMENT (OUTPATIENT)
Dept: CARDIOLOGY | Facility: CLINIC | Age: 63
End: 2023-06-30

## 2023-09-14 RX ORDER — AMLODIPINE BESYLATE 5 MG/1
5 TABLET ORAL DAILY
Qty: 90 | Refills: 3 | Status: ACTIVE | COMMUNITY
Start: 2019-07-26 | End: 1900-01-01

## 2023-09-14 RX ORDER — HYDROCHLOROTHIAZIDE 12.5 MG/1
12.5 TABLET ORAL
Qty: 90 | Refills: 1 | Status: ACTIVE | COMMUNITY
Start: 2023-02-06 | End: 1900-01-01

## 2023-11-09 ENCOUNTER — APPOINTMENT (OUTPATIENT)
Dept: CARDIOLOGY | Facility: CLINIC | Age: 63
End: 2023-11-09
Payer: COMMERCIAL

## 2023-11-09 ENCOUNTER — NON-APPOINTMENT (OUTPATIENT)
Age: 63
End: 2023-11-09

## 2023-11-09 VITALS
DIASTOLIC BLOOD PRESSURE: 68 MMHG | HEIGHT: 64 IN | SYSTOLIC BLOOD PRESSURE: 136 MMHG | RESPIRATION RATE: 16 BRPM | HEART RATE: 64 BPM | BODY MASS INDEX: 27.31 KG/M2 | WEIGHT: 160 LBS

## 2023-11-09 DIAGNOSIS — I65.29 OCCLUSION AND STENOSIS OF UNSPECIFIED CAROTID ARTERY: ICD-10-CM

## 2023-11-09 DIAGNOSIS — R42 DIZZINESS AND GIDDINESS: ICD-10-CM

## 2023-11-09 PROCEDURE — 93000 ELECTROCARDIOGRAM COMPLETE: CPT

## 2023-11-09 PROCEDURE — 99214 OFFICE O/P EST MOD 30 MIN: CPT | Mod: 25

## 2024-03-11 ENCOUNTER — APPOINTMENT (OUTPATIENT)
Dept: CARDIOLOGY | Facility: CLINIC | Age: 64
End: 2024-03-11
Payer: COMMERCIAL

## 2024-03-11 PROCEDURE — 93306 TTE W/DOPPLER COMPLETE: CPT

## 2024-04-11 ENCOUNTER — NON-APPOINTMENT (OUTPATIENT)
Age: 64
End: 2024-04-11

## 2024-04-11 ENCOUNTER — APPOINTMENT (OUTPATIENT)
Dept: CARDIOLOGY | Facility: CLINIC | Age: 64
End: 2024-04-11
Payer: COMMERCIAL

## 2024-04-11 VITALS
RESPIRATION RATE: 16 BRPM | HEART RATE: 75 BPM | BODY MASS INDEX: 26.63 KG/M2 | SYSTOLIC BLOOD PRESSURE: 150 MMHG | WEIGHT: 156 LBS | HEIGHT: 64 IN | DIASTOLIC BLOOD PRESSURE: 60 MMHG

## 2024-04-11 DIAGNOSIS — R07.9 CHEST PAIN, UNSPECIFIED: ICD-10-CM

## 2024-04-11 DIAGNOSIS — E78.5 HYPERLIPIDEMIA, UNSPECIFIED: ICD-10-CM

## 2024-04-11 DIAGNOSIS — R55 SYNCOPE AND COLLAPSE: ICD-10-CM

## 2024-04-11 DIAGNOSIS — Z86.79 PERSONAL HISTORY OF OTHER DISEASES OF THE CIRCULATORY SYSTEM: ICD-10-CM

## 2024-04-11 DIAGNOSIS — R94.31 ABNORMAL ELECTROCARDIOGRAM [ECG] [EKG]: ICD-10-CM

## 2024-04-11 PROCEDURE — G2211 COMPLEX E/M VISIT ADD ON: CPT

## 2024-04-11 PROCEDURE — 99214 OFFICE O/P EST MOD 30 MIN: CPT

## 2024-04-11 PROCEDURE — 93000 ELECTROCARDIOGRAM COMPLETE: CPT

## 2024-04-11 PROCEDURE — 99213 OFFICE O/P EST LOW 20 MIN: CPT

## 2024-04-11 RX ORDER — SIMVASTATIN 20 MG/1
20 TABLET, FILM COATED ORAL
Qty: 90 | Refills: 3 | Status: DISCONTINUED | COMMUNITY
End: 2024-04-11

## 2024-04-11 RX ORDER — SIMVASTATIN 40 MG/1
40 TABLET, FILM COATED ORAL
Refills: 0 | Status: ACTIVE | COMMUNITY

## 2024-04-11 NOTE — HISTORY OF PRESENT ILLNESS
[FreeTextEntry1] : She continues to deal with a great deal of stress at-home with her daughter who has chronic severe postpartum depression and she has also re-started smoking cigarettes periodically;   She reports her at-home blood pressures have been averaging in the high 130s to 140s over 70s to 80s despite taking her Amlodipine 5 mg nightly.  Today's BP is elevated at (146/80);  Most recent transthoracic echocardiogram from March 11, 2024 demonstrates preserved left ventricular size and systolic function with normal LVEF at 55 to 60%.  There was mild mitral insufficiency trace pulmonic and tricuspid insufficiency.  No pericardial effusion;   Cardiac catheterization was performed at [facility] on May 26, 2022. The study demonstrated normal coronary arteries with some tortuosity of the vessels but no evidence of obstructive disease-i.e. negative test;   Last Carotid duplex study (November 2020) performed at [facility] demonstrated at least moderate stenosis in the left internal carotid vessel, and mild diffuse plaquing in the right internal carotid; Possible dedicated MRA was recommended;

## 2024-04-11 NOTE — REASON FOR VISIT
[FreeTextEntry1] : DAVIDA REID is being seen for CV risk factors and non-cardiac disease, arrhythmia/ECG abnormalities, peripheral arterial disease with carotid artery stenosis,  structural heart and valve disease and hyperlipidemia.   The patient is a 63-year-old white female who has a history for abnormal EKG , hyperlipidemia, chest pain syndrome, very strong family history for heart disease for her siblings and history of abnormal nuclear stress test in the past-who ultimately underwent cardiac catheterization in May 2022 which demonstrated normal coronary arteries;  She appears to be doing fairly well from the cardiac standpoint but now reports that she does have occasional feelings of lightheadedness or dizziness which she actually has had in the past;  Once again however she blames a lot of this on her increased stress and anxiety over dealing on a regular basis with her daughter who suffers from severe depressive disorder and postpartum depression;.

## 2024-04-11 NOTE — ASSESSMENT
[FreeTextEntry1] : EKG demonstrating normal sinus rhythm at a rate of 75.  There is poor R wave progression in the anteroseptal leads with nonspecific T wave changes in the lateral leads and nonspecific T wave flattening.  Essentially unchanged;  In summary this 63-year-old woman has a history for diffuse abnormal EKG, hyperlipidemia, recent symptoms for some mild exertional dyspnea and complaints of some recent lightheadedness and dizziness with known history for peripheral arterial disease and carotid plaquing stenosis.; Unfortunately, she is still smoking several cigarettes per day and having difficulty quitting; Continues to be under a great deal of stress dealing with her daughter with severe depressive disorder and postpartum depression; (her granddaughter age 3,  now living in [place] apart from her mother)   Plan:  Patient to undergo carotid duplex study sometime in the near future at radiology office to assess for underlying carotid plaquing stenosis;  Reinforced importance of pursuing a smoking cessation course and recommended using nicotine patches etc. as discussed;  Patient recommended to maintain good p.o. hydration with fluids;  To report any significant chest symptoms or worsening dizziness between now and next visit;  Regular checkups and laboratory blood test with primary care recommended;

## 2024-04-11 NOTE — PHYSICAL EXAM
[Well Developed] : well developed [Well Nourished] : well nourished [No Acute Distress] : no acute distress [Normal Conjunctiva] : normal conjunctiva [Normal Venous Pressure] : normal venous pressure [Carotid Bruit] : carotid bruit [Normal S1, S2] : normal S1, S2 [No Rub] : no rub [No Gallop] : no gallop [Murmur] : murmur [Clear Lung Fields] : clear lung fields [Good Air Entry] : good air entry [No Respiratory Distress] : no respiratory distress  [Soft] : abdomen soft [Non Tender] : non-tender [No Masses/organomegaly] : no masses/organomegaly [Normal Gait] : normal gait [No Edema] : no edema [No Cyanosis] : no cyanosis [No Clubbing] : no clubbing [No Rash] : no rash [No Skin Lesions] : no skin lesions [Moves all extremities] : moves all extremities [No Focal Deficits] : no focal deficits [Normal Speech] : normal speech [Alert and Oriented] : alert and oriented [Normal memory] : normal memory [de-identified] : left

## 2024-08-13 NOTE — ED STATDOCS - NS ED ATTENDING STATEMENT MOD
98.8
I have personally performed a face to face diagnostic evaluation on this patient. I have reviewed the ACP note and agree with the history, exam and plan of care, except as noted.

## 2024-09-06 ENCOUNTER — APPOINTMENT (OUTPATIENT)
Dept: CARDIOLOGY | Facility: CLINIC | Age: 64
End: 2024-09-06
Payer: COMMERCIAL

## 2024-09-06 VITALS
HEART RATE: 56 BPM | HEIGHT: 64 IN | WEIGHT: 152 LBS | SYSTOLIC BLOOD PRESSURE: 144 MMHG | BODY MASS INDEX: 25.95 KG/M2 | DIASTOLIC BLOOD PRESSURE: 74 MMHG | RESPIRATION RATE: 16 BRPM

## 2024-09-06 DIAGNOSIS — R94.31 ABNORMAL ELECTROCARDIOGRAM [ECG] [EKG]: ICD-10-CM

## 2024-09-06 DIAGNOSIS — Z86.79 PERSONAL HISTORY OF OTHER DISEASES OF THE CIRCULATORY SYSTEM: ICD-10-CM

## 2024-09-06 DIAGNOSIS — I65.29 OCCLUSION AND STENOSIS OF UNSPECIFIED CAROTID ARTERY: ICD-10-CM

## 2024-09-06 DIAGNOSIS — E78.5 HYPERLIPIDEMIA, UNSPECIFIED: ICD-10-CM

## 2024-09-06 PROCEDURE — G2211 COMPLEX E/M VISIT ADD ON: CPT | Mod: NC

## 2024-09-06 PROCEDURE — 93000 ELECTROCARDIOGRAM COMPLETE: CPT

## 2024-09-06 PROCEDURE — 99214 OFFICE O/P EST MOD 30 MIN: CPT

## 2024-09-06 RX ORDER — ATORVASTATIN CALCIUM 20 MG/1
20 TABLET, FILM COATED ORAL
Qty: 90 | Refills: 3 | Status: ACTIVE | COMMUNITY
Start: 2024-09-06

## 2024-09-06 NOTE — ASSESSMENT
[FreeTextEntry1] : EKG demonstrating sinus bradycardia at a rate of 56;.  There is poor R wave progression in the anteroseptal leads with nonspecific T wave changes in the lateral leads and nonspecific T wave flattening.  Essentially unchanged;  In summary this 64-year-old woman has a history for diffuse abnormal EKG, hyperlipidemia, recent symptoms for some mild exertional dyspnea and complaints of some recent lightheadedness and dizziness with known history for peripheral arterial disease and bilateral mild carotid plaquing stenosis.;  Unfortunately, she is still smoking several cigarettes per day and having difficulty quitting;  Continues to be under a great deal of stress dealing with her daughter with severe depressive disorder and postpartum depression; (her granddaughter age 3,  now living in [place] apart from her mother)   Plan:  No additional cardiac workup indicated at this time;  Reinforced importance of pursuing a smoking cessation course and recommended using nicotine patches etc. as discussed;  Patient recommended to maintain good p.o. hydration with fluids;  To report any significant chest symptoms or worsening dizziness between now and next visit within 6 months to 1 year;  Regular checkups and laboratory blood test with primary care recommended;

## 2024-09-06 NOTE — PHYSICAL EXAM
[Well Developed] : well developed [Well Nourished] : well nourished [No Acute Distress] : no acute distress [Normal Conjunctiva] : normal conjunctiva [Normal Venous Pressure] : normal venous pressure [Carotid Bruit] : carotid bruit [Normal S1, S2] : normal S1, S2 [No Rub] : no rub [No Gallop] : no gallop [Murmur] : murmur [Clear Lung Fields] : clear lung fields [Good Air Entry] : good air entry [No Respiratory Distress] : no respiratory distress  [Soft] : abdomen soft [Non Tender] : non-tender [No Masses/organomegaly] : no masses/organomegaly [Normal Gait] : normal gait [No Edema] : no edema [No Cyanosis] : no cyanosis [No Clubbing] : no clubbing [No Rash] : no rash [No Skin Lesions] : no skin lesions [Moves all extremities] : moves all extremities [No Focal Deficits] : no focal deficits [Normal Speech] : normal speech [Alert and Oriented] : alert and oriented [Normal memory] : normal memory [de-identified] : left

## 2024-09-06 NOTE — REASON FOR VISIT
[FreeTextEntry1] : DAVIDA REID is being seen for CV risk factors and non-cardiac disease, arrhythmia/ECG abnormalities, peripheral arterial disease with carotid artery stenosis,  structural heart and valve disease and hyperlipidemia.   The patient is a 64-year-old white female who has a history for abnormal EKG , hyperlipidemia, chest pain syndrome, very strong family history for heart disease for her siblings and history of abnormal nuclear stress test in the past-who ultimately underwent cardiac catheterization in May 2022 which demonstrated normal coronary arteries;  She appears to be doing fairly well from the cardiac standpoint but still admits to being under some increased stress which has been ongoing especially over her daughter who has a history of severe depressive disorder;  She recently completed a carotid duplex study at the radiology office and here to discuss those results;

## 2024-09-06 NOTE — HISTORY OF PRESENT ILLNESS
[FreeTextEntry1] :  She reports her at-home blood pressures have been averaging in the high 130s to 140s over 70s to 80s despite taking her Amlodipine 5 mg nightly.    Carotid duplex study from radiology office recently demonstrated "mild" carotid plaquing stenosis less than 50% bilaterally with otherwise normal flow;  Most recent transthoracic echocardiogram from March 11, 2024 demonstrates preserved left ventricular size and systolic function with normal LVEF at 55 to 60%.  There was mild mitral insufficiency trace pulmonic and tricuspid insufficiency.  No pericardial effusion;   Cardiac catheterization was performed at [facility] on May 26, 2022. The study demonstrated normal coronary arteries with some tortuosity of the vessels but no evidence of obstructive disease-i.e. negative test;   Last Carotid duplex study (November 2020) performed at [facility] demonstrated at least moderate stenosis in the left internal carotid vessel, and mild diffuse plaquing in the right internal carotid; Possible dedicated MRA was recommended;